# Patient Record
Sex: FEMALE | Race: WHITE
[De-identification: names, ages, dates, MRNs, and addresses within clinical notes are randomized per-mention and may not be internally consistent; named-entity substitution may affect disease eponyms.]

---

## 2018-01-10 ENCOUNTER — HOSPITAL ENCOUNTER (OUTPATIENT)
Dept: HOSPITAL 24 - SURG1 | Age: 38
Discharge: HOME | DRG: 419 | End: 2018-01-10
Attending: SURGERY
Payer: MEDICAID

## 2018-01-10 VITALS — SYSTOLIC BLOOD PRESSURE: 131 MMHG | DIASTOLIC BLOOD PRESSURE: 73 MMHG

## 2018-01-10 DIAGNOSIS — K82.8: Primary | ICD-10-CM

## 2018-01-10 DIAGNOSIS — K81.1: ICD-10-CM

## 2018-01-10 LAB
ALBUMIN SERPL BCP-MCNC: 3.5 G/DL (ref 3.4–5)
ALP SERPL-CCNC: 68 UNITS/L (ref 46–116)
ALT SERPL W P-5'-P-CCNC: 27 UNITS/L (ref 12–78)
AST SERPL W P-5'-P-CCNC: 25 UNITS/L (ref 15–37)
BASOPHILS # BLD AUTO: 0 X10^3/UL (ref 0–0.1)
BASOPHILS NFR BLD AUTO: 0.9 % (ref 0.2–1)
BUN SERPL-MCNC: 13 MG/DL (ref 7–18)
CALCIUM ALBUM COR SERPL-SCNC: (no result) MG/DL (ref 8.5–10.1)
CALCIUM ALBUM COR SERPL-SCNC: (no result) MMOL/L (ref 136–145)
CALCIUM SERPL-MCNC: 8.7 MG/DL (ref 8.5–10.1)
CHLORIDE SERPL-SCNC: 106 MMOL/L (ref 98–107)
CO2 SERPL-SCNC: 23 MMOL/L (ref 21–32)
CREAT SERPL-MCNC: 1.06 MG/DL (ref 0.55–1.02)
EGFR  BLACK RACES: > 60 (ref 60–?)
EOSINOPHIL # BLD AUTO: 0 X10^3/UL (ref 0–0.2)
EOSINOPHIL NFR BLD AUTO: 0.8 % (ref 0.9–2.9)
ERYTHROCYTE [DISTWIDTH] IN BLOOD BY AUTOMATED COUNT: 14 % (ref 11.6–16.5)
HCT VFR BLD AUTO: 36.7 % (ref 36–47)
HGB BLD-MCNC: 12.4 G/DL (ref 12–16)
LYMPHOCYTES # BLD AUTO: 1.7 X10^3/UL (ref 1.3–2.9)
LYMPHOCYTES NFR BLD AUTO: 38.9 % (ref 21–51)
MCH RBC QN AUTO: 30.7 PG (ref 27–34)
MCHC RBC AUTO-ENTMCNC: 33.7 G/DL (ref 33–35)
MCV RBC AUTO: 91 FL (ref 80–100)
MONOCYTES # BLD AUTO: 0.5 X10^3/UL (ref 0.3–0.8)
MONOCYTES NFR BLD AUTO: 12.1 % (ref 0–13)
NEUTROPHILS # BLD AUTO: 2.1 X10^3/UL (ref 2.2–4.8)
NEUTROPHILS NFR BLD AUTO: 47.3 % (ref 42–75)
PLATELET # BLD: 223 X10^3/UL (ref 150–450)
PMV BLD AUTO: 7.9 FL (ref 7.4–11)
PROT SERPL-MCNC: 7.1 G/DL (ref 6.4–8.2)
RBC # BLD AUTO: 4.04 X10^6/UL (ref 3.5–5.4)
SODIUM SERPL-SCNC: 139 MMOL/L (ref 136–145)
WBC NRBC COR # BLD AUTO: 4.4 X10^3/UL (ref 3.6–10)

## 2018-01-10 PROCEDURE — 80053 COMPREHEN METABOLIC PANEL: CPT

## 2018-01-10 PROCEDURE — 36415 COLL VENOUS BLD VENIPUNCTURE: CPT

## 2018-01-10 PROCEDURE — 85025 COMPLETE CBC W/AUTO DIFF WBC: CPT

## 2018-01-10 PROCEDURE — 0FT44ZZ RESECTION OF GALLBLADDER, PERCUTANEOUS ENDOSCOPIC APPROACH: ICD-10-PCS | Performed by: SURGERY

## 2018-01-10 PROCEDURE — S0020 INJECTION, BUPIVICAINE HYDRO: HCPCS

## 2018-01-10 RX ADMIN — HYDROMORPHONE HYDROCHLORIDE PRN MG: 2 INJECTION, SOLUTION INTRAMUSCULAR; INTRAVENOUS; SUBCUTANEOUS at 15:00

## 2018-01-10 RX ADMIN — DILTIAZEM HYDROCHLORIDE ONE MLS: 5 INJECTION INTRAVENOUS at 13:25

## 2018-01-10 RX ADMIN — HYDROMORPHONE HYDROCHLORIDE PRN MG: 2 INJECTION, SOLUTION INTRAMUSCULAR; INTRAVENOUS; SUBCUTANEOUS at 14:56

## 2018-01-10 RX ADMIN — DILTIAZEM HYDROCHLORIDE ONE MLS: 5 INJECTION INTRAVENOUS at 13:07

## 2018-01-10 RX ADMIN — CEFAZOLIN ONE GM: 330 INJECTION, POWDER, FOR SOLUTION INTRAMUSCULAR; INTRAVENOUS at 13:08

## 2018-01-10 RX ADMIN — CEFAZOLIN ONE GM: 330 INJECTION, POWDER, FOR SOLUTION INTRAMUSCULAR; INTRAVENOUS at 13:25

## 2018-01-10 NOTE — OR.GENERIC
Post-Op Note Generic





- Post-Op Note


Operative Report: 





Date of Operation:  January 10, 2018





Pre-Operative Diagnosis:  Biliary dyskinesia.





Post-Operative Diagnosis:  1. Chronic cholecystitis.


                                              2. Biliary dyskinesia.





Procedure:  Laparoscopic cholecystectomy.





Surgeon:  Moisés Ferreira MD.





Anesthetist:  Rafa Dahl CRNA.





Specimen:   Gallbladder.





Estimated blood loss:  Minimal.





Complications:  None.








Summary:


The patient is a 37 year old female who presented with biliary dyskinesia.  The 

patient was offered cholecystectomy.  The risk and benefits of the procedure 

including difficulty with anesthesia, bleeding, infection, conversion to open 

procedure, bile leak, hernia formation, DVT, as well as PE were discussed with 

the patient.  The patient understood these risks and requested the procedure.





On January 10, 2018, the patient was brought to the operative theatre.  A time 

out was performed verifying the patient and procedure.  The patient received 

Ancef for pre-operative antibiosis.  After satisfactory induction of general 

endotracheal anesthesia, the abdomen was prepped with Chloraprep and draped in 

the usual sterile fashion.  The skin and subcutaneous tissue inferior to the 

umbilicus was anesthetized using local anesthetic.  The skin was incised 

sharply.  A 12 mm trocar was placed though the incision and into the peritoneal 

cavity using the Max technique.  Carbon dioxide was infiltrated through this 

trocar to obtain a pneumoperitoneum of 15 mm Hg.  A camera was placed through 

this trocar and swept in all directions.  No injury was seen from entering the 

peritoneal cavity.  Intra-abdominal adhesions were noted throughout the lower 

aspect of the abdomen.  A site was selected in the subxiphoid location for our 2

nd trocar.  The skin and fascia was anesthetized using local anesthetic.  The 

skin was incised sharply.  A 5 mm trocar was placed into the peritoneal cavity 

under direct visualization.  In a similar manner, two additional 5 mm trocars 

were placed.  The first was placed in the mid-clavicular line approximately 2 

fingerbreadths inferior to the left costal margin and a second in the anterior 

axillary line approximately 2 fingerbreadths inferior to the left costal 

margin.    The patient was placed in reverse Trendelenburg and rotated to the 

patients left.  The gallbladder was grasped at the fundus and elevated 

cephalad and slightly lateral.  The peritoneum on the medial and lateral 

aspects of the infundibulum of the gallbladder was scored using hook 

electrocautery.  Using blunt dissection, the cystic artery and duct were 

isolated.  The critical view of safety was obtained.  Both of these structures 

were divided between endoclips.  A posterior branch of the cystic artery was 

also divided between clips.  The gallbladder was dissected free using hook 

electrocautery.  The gallbladder was placed in an endobag and removed through 

the umbilical trocar site without difficulty.  The trocar and camera were 

placed back inside the abdomen.  Our clips were noted in good position.  

Bleeding of the gallbladder fossa was controlled using electrocautery.  At this 

point, the 5 mm trocars were removed under direct visualization.  No bleeding 

was seen.  The umbilical trocar was then removed and pneumoperitoneum released.

  The fascia at the umbilicus was closed using a 0-Vicryl placed in a figure-of-

eight configuration x 2.  The skin edges at all incisions were re-approximated 

using inverted, interrupted 4-0 Monocryl sutures.  Mastisol and Steri-strips 

were placed.  Sterile dressings were placed.  The patient was awakened and 

taken to the recovery room in stable condition.  There were no complications.  

All counts were correct.